# Patient Record
Sex: MALE | Race: WHITE
[De-identification: names, ages, dates, MRNs, and addresses within clinical notes are randomized per-mention and may not be internally consistent; named-entity substitution may affect disease eponyms.]

---

## 2018-06-11 ENCOUNTER — HOSPITAL ENCOUNTER (EMERGENCY)
Dept: HOSPITAL 95 - ER | Age: 43
LOS: 1 days | Discharge: HOME | End: 2018-06-12
Payer: COMMERCIAL

## 2018-06-11 VITALS — WEIGHT: 192 LBS | BODY MASS INDEX: 28.44 KG/M2 | HEIGHT: 69 IN

## 2018-06-11 DIAGNOSIS — E11.65: ICD-10-CM

## 2018-06-11 DIAGNOSIS — Z79.899: ICD-10-CM

## 2018-06-11 DIAGNOSIS — Z79.84: ICD-10-CM

## 2018-06-11 DIAGNOSIS — I10: ICD-10-CM

## 2018-06-11 DIAGNOSIS — H81.10: Primary | ICD-10-CM

## 2018-06-11 DIAGNOSIS — Z79.4: ICD-10-CM

## 2018-06-11 LAB
ALBUMIN SERPL BCP-MCNC: 3.3 G/DL (ref 3.4–5)
ALBUMIN/GLOB SERPL: 0.7 {RATIO} (ref 0.8–1.8)
ALT SERPL W P-5'-P-CCNC: 28 U/L (ref 12–78)
ANION GAP SERPL CALCULATED.4IONS-SCNC: 10 MMOL/L (ref 6–16)
AST SERPL W P-5'-P-CCNC: 14 U/L (ref 12–37)
BASOPHILS # BLD AUTO: 0.06 K/MM3 (ref 0–0.23)
BASOPHILS NFR BLD AUTO: 0 % (ref 0–2)
BILIRUB SERPL-MCNC: 0.3 MG/DL (ref 0.1–1)
BUN SERPL-MCNC: 19 MG/DL (ref 8–24)
CALCIUM SERPL-MCNC: 9.4 MG/DL (ref 8.5–10.1)
CHLORIDE SERPL-SCNC: 101 MMOL/L (ref 98–108)
CO2 SERPL-SCNC: 24 MMOL/L (ref 21–32)
CREAT SERPL-MCNC: 0.74 MG/DL (ref 0.6–1.2)
DEPRECATED RDW RBC AUTO: 34.9 FL (ref 35.1–46.3)
EOSINOPHIL # BLD AUTO: 0.09 K/MM3 (ref 0–0.68)
EOSINOPHIL NFR BLD AUTO: 1 % (ref 0–6)
ERYTHROCYTE [DISTWIDTH] IN BLOOD BY AUTOMATED COUNT: 11.8 % (ref 11.7–14.2)
GLOBULIN SER CALC-MCNC: 4.5 G/DL (ref 2.2–4)
GLUCOSE SERPL-MCNC: 389 MG/DL (ref 70–99)
HCT VFR BLD AUTO: 39.9 % (ref 37–53)
HGB BLD-MCNC: 14.2 G/DL (ref 13.5–17.5)
IMM GRANULOCYTES # BLD AUTO: 0.18 K/MM3 (ref 0–0.1)
IMM GRANULOCYTES NFR BLD AUTO: 1 % (ref 0–1)
LYMPHOCYTES # BLD AUTO: 2.11 K/MM3 (ref 0.84–5.2)
LYMPHOCYTES NFR BLD AUTO: 12 % (ref 21–46)
MCHC RBC AUTO-ENTMCNC: 35.6 G/DL (ref 31.5–36.5)
MCV RBC AUTO: 82 FL (ref 80–100)
MONOCYTES # BLD AUTO: 1.07 K/MM3 (ref 0.16–1.47)
MONOCYTES NFR BLD AUTO: 6 % (ref 4–13)
NEUTROPHILS # BLD AUTO: 14.43 K/MM3 (ref 1.96–9.15)
NEUTROPHILS NFR BLD AUTO: 80 % (ref 41–73)
NRBC # BLD AUTO: 0 K/MM3 (ref 0–0.02)
NRBC BLD AUTO-RTO: 0 /100 WBC (ref 0–0.2)
PLATELET # BLD AUTO: 404 K/MM3 (ref 150–400)
POTASSIUM SERPL-SCNC: 4.3 MMOL/L (ref 3.5–5.5)
PROT SERPL-MCNC: 7.8 G/DL (ref 6.4–8.2)
SODIUM SERPL-SCNC: 135 MMOL/L (ref 136–145)

## 2020-05-15 NOTE — NUR
PT ARRIVED TO UNIT AT APROX 1318 FROM ER, TRANSFERED TO BED USING SLIDER
SHEET. PT APPEARS TO BE VERY UNCOMFORTABLE, MOANING AND YELLING OUT.
HYPERTENSIVE (SEE VS). PT MEDICATED WITH TORADOL AND FENTANYL PER EMAR,
RECHECK OF BP IMPROVED, WILL CONTINUE TO MONITOR. APPEARS TO BE RESTING
COMFORTABLY AT THIS TIME.

## 2020-05-15 NOTE — NUR
NOTIFIED DR. GONZALEZ REGARDING PT'S HIGH BLOOD PRESSURE AND POOR PAIN
MANAGEMENT. NEW ORDER TO CONSULT HOSPITALIST FOR BP AND DIABETES MANAGEMENT.

## 2020-05-16 NOTE — NUR
DR GONZALEZ:
PROVIDER HAS CALLED TO UPDATE THIS RN ON POC. HE HAS DISCUSSED HIDA SCAN W/
RADIOLOGIST. BILE LEAK PRESENT BUT AT THIS TIME IS TOO SMALL FOR DRAIN TO BE
PLACED. WILL REPEAT CT SCAN OF ABD IN NEXT COUPLE OF DAYS TO DETERMINE IF
COLLECTION OF FLUID HAS INCREASED IN SIZE. OTHERWISE PT IS OKAY TO EAT/ DRINK
AS TOLERATED & OKAY TO TRANSFER BACK TO SURGICAL FLOOR STATUS ONCE NITRO DRIP
TITRATED OFF. ORDERS PLACED. WILL UPDATE PT & SPOUSE ON THIS POC.

## 2020-05-16 NOTE — NUR
CALL PLACED TO HOSPITALIST REGARDING NO PROGRESS IN BP DESPITE GIVING ORDERED
MEDS. NO ANSWER AT THIS TIME.

## 2020-05-16 NOTE — NUR
TRANSFER
PT TRANSFERED FROM 231 DUE TO HYPERTENSION. PT ARRIVED VIA BED AT 0640.
TRANSFERED TO BED BY STAFF WITH SLIDER SHEET. PT AWAKE BUT WITHDRAWN DUE TO
PAIN. ABD FIRM AND TENDER. NTG STARTED ON 15MCQ. CALL TO WIFE FOR
UPDATE.REPORT TO ON COMING NURSE

## 2020-05-16 NOTE — NUR
SHIFT SUMMARY:
NO ACUTE CHANGES SINCE PRIOR UPDATES. PT IS NOW IN BETTER SPIRITS & PAIN IS
MANAGED WELL W/ MEDS PER EMAR. LS ARE CLEAR T/O, PT ON 2L NC W/ O2 SATS > 92%.
DEEP BREATHING ENCOURAGED AS PT IS BREATHING SHALLOW R/T ABD PAIN. MONITOR
SHOWS SR W/ HR 70s. NITRO DRIP TITRATING DOWN W/ BETTER BP CONTROL. PT HAS NO
GI COMPLAINTS & IS TOLERATING PO INTAKE OF CLEAR LIQUIDS WELL. VOIDS W/O
DIFFICULTY USING URINAL & STANDING AT BEDSIDE. SKIN CONDITION OVERALL CDI,
STERI STRIPS TO LAP SITES x3 CDI.
WILL CONTINUE TO MONITOR & REPORT OFF TO RN ASSUMING CARE.

## 2020-05-16 NOTE — NUR
ASSUMED CARE OF PT AT 1915. REPORT RECEIVED AT BEDSIDE. PT PRESENTS WITH
SOMEWHAT FLAT AFFECT. WHEN QUESTIONED IF PT HAD ANY PAIN, HE DENIES. UPON
ASSESSMENT OF PT, VERY LIGHT ABDOMINAL PERCUSSION DONE WHICH PATIENT STATED
WAS VERY PAINFUL. MEDICATED PT WITH DILAUDID 1 MG. PT STATES THIS HAS
IMPROVED. ENCOURAGED COUGH AND DEEP BREATHE. TEACHING DONE ON PAIN MANAGEMENT.
PT VERBALIZES UNDERSTANDING. WILL REVIEW CHART AND PLAN OF CARE FOR THIS PT.

## 2020-05-16 NOTE — NUR
DR MALAGON:
CALL TO PROVIDER TO DISCUSS POC. NOTIFIED HIM THAT PT's BP IS NOW BETTER
CONTROLLED, HE STS TO D/C NITRO DRIP & THAT HE WILL PLACE ORDERS FOR THE PT TO
HAVE PO BP MEDICATIONS. HE STS OKAY FOR PT TO BE SURGICAL W/ TELE STATUS ONCE
NITRO HAS BEEN TURNED OFF & BP REMAINS STABLE. CBG CHECKS & INSULIN REGIMEN
HAVE BEEN CHANGED TO AC&HS AS PT NOW HAS DIET ORDERED. MAINTAINENCE IVFs D/C'd
R/T HTN & PT NOW TOLERATING PO INTAKE OF FLUIDS.

## 2020-05-17 NOTE — NUR
PT ARRIVED TO THE ROOM AT APPROXIMATELY 1245.  PT WAS ABLE TO TRANSFER HIMSELF
INDEPENDENTLY FROM THE WHEELCHAIR TO THE BED.  HE RATED HIS PAIN AT 4/10, HE
IS STOIC ABOUT HIS PAIN.  BP ELEVATED, PT WAS PROVIDED WITH PAIN MEDICATION
AND BP IMPROVED /60.  WHEN PT ARRIVED TO THE UNIT HE WAS HAVING DRAINAGE
FROM ONE OF HIS INCISIONS.  THE DRAINAGE IS CLEAR AND YELLOW/GREEN, IT APPEARS
THAT IT COULD BE BILE.  AWAITING CALL FROM DR. GONZALEZ AT THIS TIME.  WILL
CONTINUE TO MONITOR.

## 2020-05-17 NOTE — NUR
SHOULDER PAIN, NAUSEA
DR. GONZALEZ WAS CONTACTED REGARDING NAUSEA AND VOMITING AFTER PT ATTEMPTED TO
EAT JELLO.  PT ALSO COMPLAINS OF INTERMITTENT SEVERE L SHOULDER PAIN.  HE
MOANS AND IS OCCASIONALLY AWAKENED BY PAIN.  DRAINAGE FROM INCISION SITE
APPEARS TO HAVE SLOWED AT THIS TIME.  DR. GONZALEZ NOTIFIED OF CHANGES.  WILL
OBTAIN EKG AND TROPONIN PER DR. GONZALEZ.  DR. GONZALEZ REPORTED HE WILL ORDER
ANTIBIOTICS FOR THIS PATIENT.  WILL CONTINUE TO MONITOR UNTIL REPORT TO
ONCOMING RN.

## 2020-05-17 NOTE — NUR
PCU RETURNED FROM IMAGINE TO PCU 1, REPORT GIVEN TO CARMELLA BEDOYA. BELONGINGS TAKEN
OVER TO PT'S ROOM. MEDICATIONS AND CHART SENT WITH PATIENT

## 2020-05-17 NOTE — NUR
PT SEEMS TO HAVE WAVES OF PAIN THE IS LOCATED R SIDE.  C/O 4/10 NOW.  BELCHING
SEEMS TO AID PAIN CONTROL.

## 2020-05-17 NOTE — NUR
REPORT CALLED TO JONATHAN WEIR AND WILL TRANSPORT TO SURG FLOOR-215.  PT IS
CURRENTLY KNAPPING AND SEEMS THAT PAIN IS CONTROLLED.  WILL TRANFER ASAP.

## 2020-05-17 NOTE — NUR
SHIFT SUMMARY
PT HAS HAD INCREASED PAIN TOWARD THE END OF THE DAY. PAIN HAS INCREASED IN HIS
ABD AND TO HIS L SHOULDER.  DR. GONZALEZ AND DR. MALAGON WERE NOTIFIED. EKG DONE,
AWAITING TROPONIN LAB RESULTS.  BP ELEVATED, PT GIVEN HYDRALAZINE AND
LISINOPRIL PER DR. MALAGON.  HE HAS ALSO STARTED VOMITING AFTER EATING PART OF A
JELLO.  PT VOMITED AGAIN WITHOUT ANY PO INPUT.  PT STARTED ON IV FLUIDS FOR
HYDRATION.  REPORT GIVEN TO ANNIA WEIR.

## 2020-05-17 NOTE — NUR
PT TO IMAGING VIA GURNEY. STILL REPORTING PAIN BUT TOLERATED SLIDE. PLAN IS TO
FINISH SCAN AND DETERMINE WHERE TO TRANSFER AFTER.

## 2020-05-17 NOTE — NUR
PT MEDICATED WITH 10 MG HYDRALAZINE FOR PERSISTENT HYPERTENSION. PT AWAKENS
AND HAS COMPLAINTS OF INCREASED ABDOMINAL PAIN WHEREAS HE FEELS VERY "GASSY"
PT UP TO TOILET TO ATTEMPT BOWEL MOVEMENT OR EXPEL FLATUS. WILL EVALUATE PAIN
MEDICATIONS FOR PT WHEN HE RETURNS TO BED.

## 2020-05-17 NOTE — NUR
PT MEDICATED AGAIN WITH 10MG HYDRALAZINE FOR INCREASING BLOOD PRESSURE. PT
CURRENTLY SITTING UP AT SIDE OF BED. HAS BELCHING WHICH HE SAYS IMPROVES
DISCOMFORT IN ABDOMEN. DID OPT TO GIVE 1 MG DILAUDID FOR PAIN. WILL CONTINUE
TO MONITOR PT, AND WILL REPORT OFF TO ONCOMING RN.

## 2020-05-17 NOTE — NUR
PT MEDICATED WITH 1 MG DILAUDID FOR ABDOMINAL PAIN. HAVE DONE TEACHING
CONCERNING MOBILIZATION TO FACILITATE MOVEMENT OF GAS. PT HAS HAD BELCHING AND
HAS PASSED SOME FLATUS. PT STATES DILAUDID HAS HELPED WITH PAIN. WILL CONTINUE
TO MONITOR PT.

## 2020-05-17 NOTE — NUR
DRAINAGE
PT IS CONTINUING TO HAVE RUQ PAIN.  PT CONTINUES TO HAVE YELLOW/GREEN CLEAR
FLUID DRAIN FROM HIS INCISION SITE.  DR. GONZALEZ NOTIFIED.  WILL CONTINUE TO
MONITOR.

## 2020-05-17 NOTE — NUR
PT IN 10/10 PAIN AT SHIFT CHANGE WITH NAUSEA. PT GUARDING ABDOMEN AND UNABLE
TO FORM A COMPLETE SENTENCE WITH PAIN BP ELEVATED INTO 180'S. REPORTED PAIN IN
LEFT SHOULDER AND MIDDLE OF CHEST AT BASE OF STERNUM/ TO RUQ. GAVE 50 OF FENT
PER EMAR AND 2 NORCO TO HELP WITH PAIN REPORTED DECREASE TO 4/10. AT 2012 PT
IN 10/10 PAIN AGAIN RESP ELEVATED REPORTING PAIN IN CHEST LEFT SHOULDER AND
SOME IN HIS JAW. SPOKE WITH DR GONZALEZ AT 2006 REGARDING TROPONIN LEVELS, ORDERS
TO TREND ANOTHER TROPONIN IN 8HRS GIVEN. CALL OUT TO HOSPITALIST AT 2012
REGARDING NEW RESURGANCE OF PAIN AND LOCATION.

## 2020-05-17 NOTE — NUR
NAUSEA AND VOMITING
DR. MALAGON NOTIFIED  THAT PT HAS NOT BEEN ABLE TO KEEP FLUIDS DOWN SINCE HE
ARRIVED TO THE FLOOR.  WILL START LR PER DR. MALAGON.  WILL TREAT ELEVATED BP
WITH HYDRALAZINE.  WILL CONTINUE TO MONITOR UNTIL REPORT TO ONCOMING RN.

## 2020-05-18 NOTE — NUR
PT FROM SURGICAL FLOOR APPROXIMATELY @ 2135; PT ALERT; C/O 8 OF 10 IN LOWER
MIDDLE ABDOMEN; PT SPLINTING W/PILLOW; REPEAT CT WAS OBTAINED BEFORE PT
TRANSFERED TO UNIT; PIO HORN TO SEE PT; 100MCG FENTANYL GIVEN PER EMAR;
PT C/O OF NECK PAIN; HEAT/COLD OFFERED, PT REFUSED; PT ORIENTED TO UNIT; SKID
FREE SOCKS IN PLACE; EDUCATED ON SAFETY AND USE OF CALL LIGHT; CALL LIGHT IN
REACH; BED IN LOWEST POSITION

## 2020-05-18 NOTE — NUR
SHIFT SUMMARY
PT GIVEN 100 MCG FENTANYL @ 0220; 1 MG DILAUDID @ 0320; PT REQUESTING MORE
PAIN MEDICATION; HEATING PAD IN PLACE L SHOULDER; BP ELEVATED; LABETALOL 10 MG
@ 0347; HYDRALYZINE 10MG @ 0423; BP IS DOWNTRENDING; NSR NOTED ON TELE
W/ HR 88; ZOFRAN ADMINISTERED FOR NAUSEA PER EMAR; PT CURRENTLY RESTING
QUIETLY IN BED; 2L O2 NC PUT ON PT; O2 SATS >94; PT STANDS AT BEDSIDE FOR
URINAL; PT ENCOURAGED TO TELEPHONE FAMILY START OF SHIFT; ASSISTANCE OFFERED;
PT STATED HE COULD USE HIS CELL PHONE TO NOTIFY THEM HE WAS IN PCU;  PT CALLS
OUT; EDUCATED ON USING CALL LIGHT; CALL LIGHT IN REACH; BED IN LOWEST
POSITION; WILL CONTINUE TO MONITOR CLOSELY UNTIL HAND OFF TO DAY SHIFT RN.

## 2020-05-18 NOTE — NUR
SPOKE WITH DR. EASTMAN, CT ORDERED THIS AM WAS A DUPLICATE ORDER, CT WITH
CONTRAST COMPLETE LAST NIGHT. CANCELLED TODAYS CT PER VERBAL ORDER FROM DR. EASTMAN.

## 2020-05-18 NOTE — NUR
ASSUMED CARE AT 0700. REPORT FROM CARMELLA BEDOYA. RESTING SUPINE FLAT IN BED. AWAKE
AND ANSWERS QUESTIONS. MULIPTIPLE PAIN COMPLAINTS INCLUDING LEFT SIDE OF NECK,
BILATERAL ABDOMEN AND LEFT HAND. REPORTS HX NEUROPATHY TO LOWER LEGS. PLAN OF
CARE REVIEWED INCLUDING SCHEDULED PAIN MEDS. VERBAL UNDERSTANDING THAT
FENTANYL WAS GIVEN AT 0640 AND IS Q2 HOURS. WILL CONTINUE TO MONITOR.

## 2020-05-18 NOTE — NUR
UPDATE
PT C/O SEVERE PAIN; STATES FENTANYL HELPED, THEN AGAIN STATES IN A FEW MINUTES
EXTREME PAIN; SPLINTING W/ PILLOW; WRITHING IN PAIN; PROVIDER NOTIFIED; NEW
ORDER FOR DILAUDID GIVEN; CALL LIGHT IN REACH

## 2023-07-06 NOTE — NUR
ARRIVAL TO ICU/SHIFT SUMMARY
PT ARRIVES TO ICU FROM ER AT 1440 FOR HYPERTENSIVE EMERGENCY. PT REPORTS RIGHT
SIDED NUMBNESS AND TINGLING SINCE MONDAY c INCREASING SEVERITY. ALSO REPORTS
INTERMITTANT HA. PT A&OX 4. FOLLOWS COMMANDS. ABLE TO TRANSFER TO BED BUT
APPEARS TO LACK COORDINATION ON RIGHT SIDE.  WEAKER ON RIGHT, EQUAL
STRENGTH BLE. NO FACIAL DROOP. HTN NOTED, ON NICADIPINE GTT ON ARRIVAL.
CURRENTLY ON STANDBY, /74. SR, RATE 60-70'S. TOLERATED DINNER WELL. PT
DENIES SMOKING HX, EDUCATION ON FIRE RISK. WILL CONTINUE TO MONITOR UNTIL
REPORT TO ONCOMING NURSE.

## 2023-07-06 NOTE — NUR
FIRE SAFETY CHECK:
 
PT EDUCATED REGARDING IGNITION SOURCES AND RISK OF INJURY WHILE OXYGEN IS IN
USE. PT VERBALIZES THAT HE DOES NOT HAVE ANY IGNITION SOURCES IN HIS
POSSESSION AND STATES UNDERSTADING REGARDING FIRE SAFETY.

## 2023-07-06 NOTE — NUR
ASUMPTION OF CARE/ASSESSMENT:
 
ASSUMED CARE OF PT AT 1900. PT IS A&O X 4, PLEASANT AND COOPERATIVE WITH CARE.
PT HAS ACUTE CVA CAUSING R. SIDE WEAKNESS THAT IS AFFECTING  STRENGTH IN
R. HAND AND WEAKNESS THROUGHOUT R. LEG. WEAKNESS AFFECTING GAIT BUT PT IS
STILL ABLE TO GET OUT OF BED AND AMBULATE IN ROOM WITH FWW. PT C/O DIZZINESS
AND MEDICATED PER EMAR. CURRENTLY PT ON RA WITH SPO2 99<, RR 16 AND AND LUNG
SOUNDS CLEAR THROUGHOUT. SR ON MONITOR WITH HR 60'S AND -140'S; PT HAS
NO CHEST PAIN AT THIS TIME AND NICARDIPINE GTT ON SB. PT HAS HYPERACTIVE BOWEL
SOUNDS IN ALL QUADRANTS; ABD SOFT, NON-TENDER. PT IS TYPE 1 DIABETIC AND HAS
INSULIN PUMP IN R. LOWER ABD; PT SIGNED CONSENT ABOUT MANAGING INSULIN
INDEPENDENTLY. PT USING CALL LIGHT APPROPRIATELY, BED LOWERED, WILL CONTINUE
TO MONITOR.

## 2023-07-07 NOTE — NUR
SHIFT SUMMARY
ASSUMED CARE AT 1530. STATUS CHANGED TO MED s TELE. PT RESTING IN BED.
INDEPENDENT. CONTINUES TO REPORT NUMBNESS TO RIGHT SIDE. ABLE TO MOVE
INDEPENDENTLY IN ROOM, FEED SELF, COMPLETE ADLS. BP STABLE. DENIES NEEDS. WILL
CONTINUE TO MONITOR UNTIL REPORT TO ONCOMING NURSE.

## 2023-07-07 NOTE — NUR
UPDATE
PT REMAINS ALERT AND ORIENTED. HE CONTINUES TO HAVE R SIDED WEAKNESS THAT
FEELS UNCHANGED SINCE PREVIOUS ASSESSMENT. PT COMPLETES ADLS INDEPENDENTLY.
NSR ON MONITOR WITH RATE IN 60S. SBP 160S-180S. PT REPORTS DIZZINESS HAS
SUBSIDED.

## 2023-07-07 NOTE — NUR
PT UPDATE:
 
PT CALLED THIS RN INTO ROOM AFTER WAKING UP FROM SLEEP FEELING LIKE HIS BLOOD
SUGAR WAS DROPPING. CBG TAKEN AND CAME BACK AT 53; PT GIVEN 8 OZ OF ORANGE
JUICE AND SOME CHEESE AND CRACKERS. PLAN TO RECHECK CBG IN 30 MINUTES.

## 2023-07-07 NOTE — NUR
ASSUMPTION OF CARE
PT IS ALERT AND ORIENTED WITH PLEASANT AFFECT. PT C/O DIZZINESS AND R SIDED
WEAKNESS. HE EXPLAINS R SIDE "FEELS HEAVY". PT ABLE TO MOVE ALL EXTREMITIES.
NSR ON MONITOR. SBP 160S-180S. PT WORKED WITH PT/OT. SEE SHIFT ASSESSMENT.

## 2023-07-07 NOTE — NUR
ASSUMED CARE OF PT AT 1900. REPORT RECEIVED. PT PRESENTS IN BED. ALERT AND
ORIENTED. PLEASANT AND COOPERATIVE WITH CARE AND ASSESSMENT. DENIES COMPLAINTS
OF PAIN OR N/V. PT STATES THAT THE NUMBNESS AND HEAVINESS OF RIGHT SIDE HAS
REMAINED UNCHANGED THROUGHOUT THE DAY.  ARE WEAKER ON RIGHT SIDE. PUSH/
PULL OF FEET AGAIN REVEAL WEAKNESS TO THE RIGHT SIDE. PT REMAINS INDEPENDENT
IN BED AND ROOM. DOES AGREE TO CALL IF HE NEEDS ANY ASSIST. NO S/S IGNITION
SOURCE IN ROOM. PT REMAINS ON ROOM AIR. WILL REVIEW CHART AND PLAN OF CARE FOR
THIS PT.

## 2023-07-07 NOTE — NUR
ADMIT NOTE
PT ARRIVED TO FLOOR VIA WC. PT ORIENTED TO UNIT. PERSONAL POSSESSIONS WITH PT.
PT HAS AN INSULIN PUMP IN PLACE. PAPERWORK FOR INSULIN PUMP IS SIGNED BY PT
AND IN HARDCOPY CHART. BED ALARM ACTIVE. CALL BUTTON WITHIN REACH. None known

## 2023-07-08 NOTE — NUR
SHIFT SUMMARY
ADMITTED FOR HTN/RIGHT SIDED WEAKNESS. FULL CODE. FOUND TO HAVE AN ACUTE
LEFT PONTINE CVA. RIGHT SIDED WEAKNESS. TRANSFERED FROM ICU TO MED
FLOOR THIS SHIFT. PHYSICAL & OCCUPATIONAL THERAPIES ARE ASSISTING. DM 1, WITH
INSULIN PUMP. RENAL DIET DUE TO CKD4 HX. ACHS CBG'S. PLAVIX IS SCHEDULED. HE
IS A&O X4. STANDBY ASSIST - BRP. ANXIETY & SLEEPLESSNESS NOTED THIS SHIFT,
MEDICATED PER EMAR. PT IS ABLE TO TELL WHEN HIS GLUCOSE IS LOW, SNACKS HAVE
SUCCESSFULLY CORRECTED THIS ISSUE.

## 2023-07-08 NOTE — NUR
CALLED HOSPITALIST
PT IS CONTINUOUSLY EXERCISING HIS RIGHT ARM. HE HAS NOT SLEPT. HE STATES HE
IS ANXIOUS AND RESTLESS. HE APPEARS EXTREMELY ANXIOUS. HE REQUESTS I CALL THE
HOSPITALIST. SNACKS AND JUICE GIVEN. NEW MEDICATION ORDERED AND GIVEN. WILL
CONTINUE TO MONITOR.

## 2023-07-08 NOTE — NUR
SHIFT SUMMARY
PT WOKE FOR BS REPORT THIS AM. PER REPORT, PT GIVEN ATIVAN FOR ANXIETY ON NOC
SHIFT. PT UP TO EOB FOR MEALS AND ABLE TO WORK WITH THERAPY THIS AM. PT ABLE
TO AMBULATE IN HALLS AS WELL. PT SHOWING SOME R SIDE WEAKNESS IN EXTREMITIES,
BUT ABLE TO USE ARM, HAND AND LEG AS NEEDED. UP WITH SBA TO BTHRM. PT ADMITTED
FOR HYPERTENSIVE EMERGENCY; BP MEDS GIVEN. HX OF HTN, CHF, CKD STAGE 4, AND DM
TYPE I. PT WITH INSULIN PUMP AND ABLE TO GIVE OWN INSULIN PER CBG. PT WORKING
AND FLEXING R HAND AND ARM IN BED ALL MORNING AND AFTERNOON, ON HIS OWN. PT
LATER MEDICATED THIS AFTERNOON FOR C/O PAIN TO R SHOULDER. PT RESTING QUIETLY
AT THIS TIME, WITH EYES CLOSED. NO FURTHER S/SX'S OF DISTRESS NOTED AT THIS
TIME. CALL LT IN REACH. ABLE TO MAKE NEEDS KNOWN.

## 2023-07-09 NOTE — NUR
SHIFT SUMMARY
PT RESTING QUIETLY AT START OF SHIFT. WOKE EASILY FOR CARE. REPORTED GETTING
SOMETHING FOR SLEEP LATE LAST NIGHT, WHICH DID HELP HIM GET SOME REST. PT IS
UP TO BTHRM WITH 1P SBA USING FWW. PT IS WEAK ON R SIDE AND A LITTLE UNSTEADY
WHEN FIST GETTING UP. DID NOT C/O PAIN TO R SHOULDER TODAY AS HE DID
YESTERDAY. PT DID C/O DIZZINESS IN AFTERNOON; MEDICATED PER EMAR. FAMILY IN TO
VISIT FOR A WHILE. PT AWAKE TALKING ON PHONE OFF AND ON. DENIED FURTHER NEEDS.
PT WAITING TO BE D/C'D TO REHAB WHEN ARRANGEMENTS MADE; POSSIBLY TOMORROW OR
TUES, PER DR SANTANA. CALL LT IN REACH.

## 2023-07-09 NOTE — NUR
SHIFT SUMMARY
PT C/O R SHOULDER PAIN WHICH IMPROVED WITH PRN TYLENOL ADMINISTRATION. PT
HOWEVER HAD DIFFICULTY FALLING ASLEEP. MADELINE TO NOTIFIED AND ONE TIME ORDER OF
MELATONIN GIVEN. INSTRUCTED THAT IF INEFFECTIVE AFTER 1 HOUR, PT
COULD HAVE 7.5MG TEMAZEPAM. PT STILL UNABLE TO SLEEP AFTER MELATONIN. ONE TIME
ORDER OF TEMAZEPAM PLACED AND GIVEN TO PT WITH DESIRED EFFECT ACHEIVED. PT
SLEPT THE REMAINDED OF NIGHT WITH NO COMPLAINTS. Q1H FIRE SAFETY CHECKS
COMPLETED WITH NO SOURCES OF IGNITION FOUND.

## 2023-07-10 NOTE — NUR
PT HAS BEEN INSTRUCTED AT HOURLY ROUNDINGS ABOUT HAZARD OF INGITION ITEMS IN
ROOMS WITH O2 RUNNING. PT HAS VERBALIZED UNDERSTANDING.

## 2023-07-10 NOTE — NUR
PT AOX4 AND COOPERATIVE OF CARE. CONTINUES TO HAVE R SIDED WEAKNESS AND IS
WORKING HARD WITH PHYSICAL THERAPY AND OT ON THIS. PT CONTINUES TO HAVE SOME
LOOSE STOOL AND HAS BEEN TREATED PER EMAR. CALL LIGHT IS WITHIN REACH WILL
CONTINUE TO MONITOR.

## 2023-07-10 NOTE — NUR
SHIFT SUMMARY
NO ACUTE CHANGES NOTED DURING SHIFT. PT ALERT AND ORIENTED, CALLS
APPROPRIATELY. PT REMAINS ON RA, SBA WITH FWW TO BATHROOM. FLUIDS STARTED PER
RENAL. PENDING 24HR URINE COLLECTION. PLANS TO D/C TO FACILITY ONCE MEDICALLY
CLEARED. WILL CONTINUE TO MONITOR. CALL LIGHT WITHIN REACH.

## 2023-07-11 NOTE — NUR
SHIFT SUMMARY:
PT A/O X 4, IND IN ROOM AT THIS TIME. PT CONTINUES TO HAVE NUMBNESS TO R SIDE
FINGERS AND FOOT. PT PAIN MANAGED TODAY WITH TYLENOL. PT STARTED RENVELA WITH
DINNER AND TOLERATED WELL.

## 2023-07-11 NOTE — NUR
FIRE SAFETY
PATIENT EDUCATED ON HOURLY ROUNDING FOR FIRE SAFETY AND RISK OF INJURY WHILE
OXYGEN IN USE. PATIENT VERBALIZED UNDERSTANDING, DENIES SMOKING NOR HAVING ANY
IGNITION SOURCES.

## 2023-07-11 NOTE — NUR
SHIFT SUMMARY
PATIENT A/0X4, PLEASANT, COOPERATIVE. NO C/O PAIN NOR DISCOMFORT STATED THIS
SHIFT. ONGING C/O RIGHT SIDED WEAKNESS, RIGHT  IS VERY WEAK, UNABLE TO
GRASP. NOTED IMPROVED STRENGTH IN RLE, PERFORMED FULL ROM, MAINTAINED GOOD
CONTROL OF RLE, NO DRIFTING OBSERVED, GOOD MUSCLE TONE. AT 0420 PATINET C/O
FEELING SYMPTOMATIC OF LOW BG, WAS 49. MD NOTIFIED, ADMINISTER ROUTINE
INTERVENTIONS AND RE-CHECK IN 1/2HR, RETURNED TO BASELINE, 104, PATINET STATES
FEELING MUCH BETTER, NO FURTHER S/SX OF HYPOGLYCEMIA AT THIS TIME. PATIENT
APPEARES TO BE RESTING IN BED AT THIS TIME IN NO ACUTE DISTRESS. BED LOW, CALL
LIGHT WITHIN REACH.

## 2023-07-12 NOTE — NUR
LOW BLOOD SUGAR
PT CALLED AT 0735 STATING HE THOUGHT HIS BLOOD SUGAR WAS LOW. PT PALE, CLAMMY.
HE REQUESTED JUICE WITH SUGAR. STAT CBG DONE IT WAS 47. HE DRANK THE JUICE .
RE CHECKED CBG AY 0809 CBG 62. BREAKFAST BROUGHT TO HIM. HE %. HE
MENTIUONED THAT HIS FASTING BLOOD SUGAR YESTERDAY WAS LOW TOO. HE HELD THE
INSULIN PUMP FOR 1 HOUR. THEN DECREASED THE BASELL RATE TO 1.1 U/HR. CONTINUE
POC.

## 2023-07-12 NOTE — NUR
SHIFT SUMMARY NOC
 
PT A/O X 4. PLEASANT AND COOPERATIVE WITH CARE. PT REPORTS RANGE OF MOTION AND
STRENGTH ARE IMPROVING IN R SIDE AFTER RECENT CVA. PT IS WORKING WITH
NEPRHROLOGIST WHO FOLLOWS HIS CASE FOR FOLLOW UP FOR ESRD. PT MAY DISCHARGE
TO SNF IN [place] TOMORROW. PT IS CURRENTLY RESTING WITH BED IN LOWEST
POSITION, AND CALL LIGHT WITHIN REACH.

## 2023-08-16 NOTE — NUR
IV DC'D, CATH INTACT. PT GIVEN DC INSTRUCTIONS, VERBALIZED UNDERSTANDING. OUT
TO CAR VIA WHEELCHAIR, ACCOMPANIED BY SON.

## 2023-08-16 NOTE — NUR
PT BACK TO RECOVERY ROOM VIA RECLINER AFTER PROCEDURE. AWAKE AND ALERT, DENIES
ANY PAIN OR DISCOMFORT. VSS, PO FLUIDS GIVEN PER REQUEST.